# Patient Record
Sex: FEMALE | Race: BLACK OR AFRICAN AMERICAN | NOT HISPANIC OR LATINO | Employment: UNEMPLOYED | URBAN - METROPOLITAN AREA
[De-identification: names, ages, dates, MRNs, and addresses within clinical notes are randomized per-mention and may not be internally consistent; named-entity substitution may affect disease eponyms.]

---

## 2021-07-05 ENCOUNTER — HOSPITAL ENCOUNTER (EMERGENCY)
Dept: EMERGENCY MEDICINE | Facility: CLINIC | Age: 66
Discharge: HOME | End: 2021-07-05
Attending: EMERGENCY MEDICINE
Payer: COMMERCIAL

## 2021-07-05 DIAGNOSIS — M25.532 LEFT WRIST PAIN: ICD-10-CM

## 2021-07-05 LAB
BASOPHILS # BLD AUTO: 0.1 THOU/UL (ref 0–0.2)
BASOPHILS NFR BLD AUTO: 1 % (ref 0–2)
C REACTIVE PROTEIN LHE: 0.8 MG/DL (ref 0–0.8)
EOSINOPHIL # BLD AUTO: 0.1 THOU/UL (ref 0–0.4)
EOSINOPHIL NFR BLD AUTO: 1 % (ref 0–6)
ERYTHROCYTE [DISTWIDTH] IN BLOOD BY AUTOMATED COUNT: 13.7 % (ref 11–14.5)
HCT VFR BLD AUTO: 42.3 % (ref 35–47)
HGB BLD-MCNC: 13.3 G/DL (ref 12–16)
IMM GRANULOCYTES # BLD: 0 THOU/UL
IMM GRANULOCYTES NFR BLD: 0 %
LYMPHOCYTES # BLD AUTO: 2.7 THOU/UL (ref 0.8–4.4)
LYMPHOCYTES NFR BLD AUTO: 33 % (ref 20–40)
MCH RBC QN AUTO: 28.1 PG (ref 27–34)
MCHC RBC AUTO-ENTMCNC: 31.4 G/DL (ref 32–36)
MCV RBC AUTO: 89 FL (ref 80–100)
MONOCYTES # BLD AUTO: 0.6 THOU/UL (ref 0–0.9)
MONOCYTES NFR BLD AUTO: 7 % (ref 2–10)
NEUTROPHILS # BLD AUTO: 4.9 THOU/UL (ref 2–7.7)
NEUTROPHILS NFR BLD AUTO: 58 % (ref 50–70)
PLATELET # BLD AUTO: 206 THOU/UL (ref 140–440)
PMV BLD AUTO: 12 FL (ref 8.5–12.5)
RBC # BLD AUTO: 4.74 MILL/UL (ref 3.8–5.4)
WBC: 8.4 THOU/UL (ref 4–11)

## 2021-07-05 ASSESSMENT — MIFFLIN-ST. JEOR: SCORE: 1260.76

## 2021-07-30 VITALS — HEIGHT: 64 IN | BODY MASS INDEX: 27.31 KG/M2 | WEIGHT: 160 LBS

## 2021-07-30 NOTE — ED NOTES
ED Notes by Saeed Sanchez, RN at 7/5/2021 11:01 PM     Author: Saeed Sanchez RN Service: -- Author Type: Registered Nurse    Filed: 7/5/2021 11:02 PM Date of Service: 7/5/2021 11:01 PM Status: Signed    : Saeed Sanchez RN (Registered Nurse)       Ortho splint applied to the left wrist, good peripheral circulation post application

## 2021-07-30 NOTE — ED PROVIDER NOTES
"ED Provider Notes by Davonte Prescott DO at 7/5/2021  8:42 PM     Author: Davonte Prescott DO Service: Emergency Medicine Author Type: Physician    Filed: 7/6/2021  2:07 AM Date of Service: 7/5/2021  8:42 PM Status: Signed    : Davonte Prescott DO (Physician)       EMERGENCY DEPARTMENT NOTE     Name: Lucero Cervantes    Age/Sex: 66 y.o. female   MRN: [id number]   Evaluation Date & Time:  7/5/2021  8:08 PM    PCP:    Wilber Otoole MD   ED Provider: Davonte Prescott D.O.       CHIEF COMPLAINT      Chief Complaint   Patient presents with   ? Wrist Pain       DIAGNOSIS & DISPOSITION     1. Left wrist pain      DISPOSITION: Home    At the conclusion of the encounter I discussed the results of all of the tests and the disposition. The questions were answered. The patient or family acknowledged understanding and was agreeable with the care plan.    TOTAL CRITICAL CARE TIME (EXCLUDING PROCEDURES): Not applicable    PROCEDURES:   None    EMERGENCY DEPARTMENT COURSE/MEDICAL DECISION MAKING   8:32 PM I met with the patient to gather history and to perform my initial exam.  We discussed treatment options and the plan for care while in the Emergency Department. PPE worn includes surgical mask and cap.    66 y.o. female with relevant past history of diet-controlled diabetes, hypertension, hyperlipidemia GERD who presents to the emergency department for evaluation of left wrist pain that was noted after she had fallen asleep possibly been sleeping on it.  Patient denied any history of trauma.  Vital signs:/90 (Patient Position: Semi-ramirez)   Pulse 85   Temp 97.8  F (36.6  C) (Oral)   Resp 16   Ht 5' 4\" (1.626 m)   Wt 160 lb (72.6 kg)   SpO2 98%   BMI 27.46 kg/m    Pertinent physical exam findings:  Diagnostic studies:  Imaging: Left wrist x-ray: No fracture  Lab: WBC 8.4, CRP 0.8  Interventions: Hydrocodone, neurologic, wrist splint  Medical decision making: Pain is improved.  Because of pain not " definitively identified.  Has history of diet-controlled diabetes but exam does not suggest septic arthritis and laboratory evaluation reassuring.  No evidence of fracture or bony abnormality on x-ray.  Patient will be discharged and will use this splint for comfort until follow-up with primary care physician within 2 days.  Continue Tylenol and ibuprofen.  If progressive symptoms including worsening pain not improved with Tylenol ibuprofen or develop s swelling or redness of the wrist will return the emergency department.    ED INTERVENTIONS     Medications   HYDROcodone-acetaminophen 5-325 mg per tablet 1 tablet (1 tablet Oral Given 7/5/21 2116)   ketorolac injection 15 mg (TORADOL) (15 mg Intravenous Given 7/5/21 2116)       DISCHARGE MEDICATIONS        Medication List      ASK your doctor about these medications    famotidine 20 MG tablet  Commonly known as: PEPCID  Take 1 tablet (20 mg total) by mouth 2 (two) times a day.     ondansetron 4 MG tablet  Commonly known as: ZOFRAN  Take 1-2 tablets (4-8 mg total) by mouth every 8 (eight) hours as needed for nausea.     sucralfate 100 mg/mL suspension  Commonly known as: CARAFATE  Take 10 mL (1 g total) by mouth 4 (four) times a day.              INFORMATION SOURCE AND LIMITATIONS    History/Exam limitations: None  Patient information was obtained from: Patient   Use of : Yes, patient's son acted as .     HISTORY OF PRESENT ILLNESS   Lucero A Woldeyes female with a relevant past history of HTN, DM type II on insulin, who presents to this ED via walk in for evaluation of wrist pain.    Patient reports onset of 10/10 left wrist pain yesterday morning upon waking up after sleeping on her arm. She denies any trauma or falls. She endorses worse pain with palpation. Denies any other complaints.        REVIEW OF SYSTEMS:   Constitutional: Negative for  fever.   HENT: Negative for URI symptoms or sore throat.    Eyes: Negative for visual disturbance.    Cardiac: Negative for  chest pain,palpitations, near syncope or syncope  Respiratory: Negative for cough and shortness of breath.    Gastrointestinal: Negative for abdominal pain, nausea, vomiting, constipation, diarrhea, rectal bleeding or melena.  Genitourinary: Negative for dysuria, flank pain and hematuria.   Musculoskeletal: Negative for back pain. Positive for left wrist pain.  Skin: Negative for  rash  Neurological: Negative for dizziness, headache, syncope, speech difficulty, unilateral weakness or imbalance with walking.   Hematological: Negative for adenopathy. Does not bruise/bleed easily.   Psychiatric/Behavioral: Negative for confusion.       PATIENT HISTORY   Relevant past medical history reviewed with patient, unless otherwise stated in HPI, history not pertinent to this visit.  Patient Active Problem List   Diagnosis   ? Age-related nuclear cataract of both eyes   ? Controlled type 2 diabetes mellitus without complication, without long-term current use of insulin (H)   ? Essential hypertension   ? Lumbar radiculopathy   ? Positive FIT (fecal immunochemical test)   ? Pure hypercholesterolemia   ? Nonspecific reaction to tuberculin skin test without active tuberculosis   ? Sciatica   ? Swelling, mass, or lump in chest     No past surgical history on file.  No family history on file.  Social History     Socioeconomic History   ? Marital status: Single     Spouse name: Not on file   ? Number of children: Not on file   ? Years of education: Not on file   ? Highest education level: Not on file   Occupational History   ? Not on file   Social Needs   ? Financial resource strain: Not on file   ? Food insecurity     Worry: Not on file     Inability: Not on file   ? Transportation needs     Medical: Not on file     Non-medical: Not on file   Tobacco Use   ? Smoking status: Not on file   Substance and Sexual Activity   ? Alcohol use: Not on file   ? Drug use: Not on file   ? Sexual activity: Not on file  "  Lifestyle   ? Physical activity     Days per week: Not on file     Minutes per session: Not on file   ? Stress: Not on file   Relationships   ? Social connections     Talks on phone: Not on file     Gets together: Not on file     Attends Spiritism service: Not on file     Active member of club or organization: Not on file     Attends meetings of clubs or organizations: Not on file     Relationship status: Not on file   ? Intimate partner violence     Fear of current or ex partner: Not on file     Emotionally abused: Not on file     Physically abused: Not on file     Forced sexual activity: Not on file   Other Topics Concern   ? Not on file   Social History Narrative   ? Not on file     No Known Allergies      OUTPATIENT MEDICATIONS     No current facility-administered medications on file prior to encounter.      Current Outpatient Medications on File Prior to Encounter   Medication Sig   ? famotidine (PEPCID) 20 MG tablet Take 1 tablet (20 mg total) by mouth 2 (two) times a day.   ? ondansetron (ZOFRAN) 4 MG tablet Take 1-2 tablets (4-8 mg total) by mouth every 8 (eight) hours as needed for nausea.   ? sucralfate (CARAFATE) 100 mg/mL suspension Take 10 mL (1 g total) by mouth 4 (four) times a day.         PHYSICAL EXAM     Vitals:    07/05/21 2003 07/05/21 2329   BP: (!) 154/99 145/90   Patient Position: Sitting Semi-ramirez   Pulse: 90 85   Resp: 20 16   Temp: 97.8  F (36.6  C)    TempSrc: Oral    SpO2: 98% 98%   Weight: 160 lb (72.6 kg)    Height: 5' 4\" (1.626 m)        Physical Exam   Constitutional: Oriented to person, place, and time. Appears well-developed and well-nourished.   HEENT:    Head: Atraumatic.    Nose: Nose normal.    Mouth/Throat: Oropharynx is clear and moist.    Eyes: EOM are normal. Pupils are equal, round, and reactive to light.   Neck: Normal range of motion. Neck supple.   Cardiovascular: Normal rate, regular rhythm and normal heart sounds.    Pulmonary/Chest: Normal effort  and breath " sounds normal.   Abdominal: Soft. Bowel sounds are normal.   Musculoskeletal: Normal range of motion. Tenderness of left wrist. Elbow nontender. Shoulder nontender.  Neurological: Alert and oriented to person, place, and time. Normal strength.No sensory deficit. No cranial nerve deficit . Coordination serial fingers, finger-to-nose normal and gait normal.   Skin: Skin is warm and dry.   Psychiatric: Normal mood and affect. Behavior is normal. Thought content normal.       DIAGNOSTICS    LABORATORY FINDINGS (REVIEWED AND INTERPRETED):  Labs Reviewed   HM1 (CBC WITH DIFF) - Abnormal; Notable for the following components:       Result Value    MCHC 31.4 (*)     All other components within normal limits   C-REACTIVE PROTEIN - Normal   1(CBC WITH DIFFERENTIAL)    Narrative:     The following orders were created for panel order HM1(CBC and Differential).  Procedure                               Abnormality         Status                     ---------                               -----------         ------                     HM1 (CBC with Diff)[[id number]]          Abnormal            Final result                 Please view results for these tests on the individual orders.         IMAGING (REVIEWED AND INTERPRETED):  Xr Wrist Left 2 Vws    Result Date: 7/5/2021  EXAM: XR WRIST LEFT 2 VWS LOCATION: [facility] DATE/TIME: 7/5/2021 9:04 PM INDICATION: non traumatic swollen painful wrist COMPARISON: None.     Normal joint spaces and alignment. No fracture.            I, Chip Coleman, am serving as a scribe to document services personally performed by Davonte Prescott D.O., based on my observation and the providers statements to me.    I, Davonte Prescott D.O., attest that Chip Coleman is acting in a scribe capacity, has observed my performance of the services and has documented them in accordance with my direction.    Davonte Prescott D.O.  EMERGENCY MEDICINE   07/06/21  [facility]  [facility] EMERGENCY ROOM  [address] [address]  Dept: [phone]  Loc: [phone]     Davonte Prescott,   07/06/21 0207

## 2021-10-31 ENCOUNTER — HOSPITAL ENCOUNTER (EMERGENCY)
Facility: CLINIC | Age: 66
Discharge: HOME | End: 2021-10-31
Attending: EMERGENCY MEDICINE | Admitting: EMERGENCY MEDICINE
Payer: COMMERCIAL

## 2021-10-31 VITALS
SYSTOLIC BLOOD PRESSURE: 146 MMHG | WEIGHT: 165 LBS | DIASTOLIC BLOOD PRESSURE: 93 MMHG | TEMPERATURE: 97.8 F | OXYGEN SATURATION: 99 % | RESPIRATION RATE: 18 BRPM | HEART RATE: 82 BPM | BODY MASS INDEX: 28.32 KG/M2

## 2021-10-31 DIAGNOSIS — R22.1 NECK NODULE: ICD-10-CM

## 2021-10-31 DIAGNOSIS — H60.391 INFECTIVE OTITIS EXTERNA, RIGHT: ICD-10-CM

## 2021-10-31 PROCEDURE — 250N000013 HC RX MED GY IP 250 OP 250 PS 637: Performed by: EMERGENCY MEDICINE

## 2021-10-31 PROCEDURE — 99283 EMERGENCY DEPT VISIT LOW MDM: CPT

## 2021-10-31 RX ORDER — CIPROFLOXACIN AND DEXAMETHASONE 3; 1 MG/ML; MG/ML
4 SUSPENSION/ DROPS AURICULAR (OTIC) 2 TIMES DAILY
Qty: 2.8 ML | Refills: 0 | Status: SHIPPED | OUTPATIENT
Start: 2021-10-31 | End: 2021-11-07

## 2021-10-31 RX ORDER — CIPROFLOXACIN AND DEXAMETHASONE 3; 1 MG/ML; MG/ML
4 SUSPENSION/ DROPS AURICULAR (OTIC) ONCE
Status: COMPLETED | OUTPATIENT
Start: 2021-10-31 | End: 2021-10-31

## 2021-10-31 RX ADMIN — CIPROFLOXACIN AND DEXAMETHASONE 4 DROP: 3; 1 SUSPENSION/ DROPS AURICULAR (OTIC) at 20:43

## 2021-10-31 ASSESSMENT — ENCOUNTER SYMPTOMS
LIGHT-HEADEDNESS: 0
VOMITING: 0
CONFUSION: 0
FEVER: 0
DIAPHORESIS: 0
FATIGUE: 0
NAUSEA: 0
COLOR CHANGE: 1
HEADACHES: 0
DIZZINESS: 0
CHILLS: 0
NUMBNESS: 0

## 2021-10-31 NOTE — ED TRIAGE NOTES
Patient has R ear external infection for the past 3 days, unsure if it is also internal, she had a surgery on her neck and is wondering if there is connection.  Siri Mendoza RN.......10/31/2021 6:58 PM

## 2021-11-01 NOTE — ED PROVIDER NOTES
EMERGENCY DEPARTMENT ENCOUNTER      NAME: Judith Cervantes  AGE: 66 year old female  YOB: 1955  MRN: [id number]  EVALUATION DATE & TIME: 10/31/2021  8:06 PM    PCP: Wilber Otoole    ED PROVIDER: George Osborne M.D.      Chief Complaint   Patient presents with     Infection         IMPRESSION  1. Infective otitis externa, right    2. Neck nodule        PLAN  - ciprodex 4 drops right ear q12h x7 days  - NSAIDs for pain  - close PCP follow up  - discharge to home    ED COURSE & MEDICAL DECISION MAKING    ED Course as of Oct 31 2028   Sun Oct 31, 2021   2022 66yoF with mild otitis externa; TM intact with no suggestion of AOM on exam. No concern for mastoiditis, meningitis, sepsis, chondritis, emergent life-threatening etiology. Also asked about nontender anterior neck nodule; states she had surgery here 16 years ago---doubt contributory or related to OE here now---advised PCP clinic follow up about this and patient & son voiced understanding and agreement. Given first dose of CiproDex here now and will continue at home. Return precautions and need for PCP follow up discussed and understood. No further questions at the time of discharge.          8:13 PM I met with the patient for the initial interview and physical examination. Discussed plan for treatment and workup in the ED. We discussed the plan for discharge and the patient is agreeable. Reviewed supportive cares, symptomatic treatment, outpatient follow up, and reasons to return to the Emergency Department. Patient to be discharged by ED RN.        I wore the following PPE during this patient encounter:  N95 mask, face shield w/ eye protection, gloves.    MEDICATIONS GIVEN IN THE EMERGENCY DEPARTMENT  Medications   ciprofloxacin-dexamethasone (CIPRODEX) 0.3-0.1 % otic suspension 4 drop (has no administration in time range)         NEW PRESCRIPTIONS STARTED AT TODAY'S ER VISIT  Current Discharge Medication List      START taking these medications     Details   ciprofloxacin-dexamethasone (CIPRODEX) 0.3-0.1 % otic suspension Place 4 drops into the right ear 2 times daily for 7 days  Qty: 2.8 mL, Refills: 0         CONTINUE these medications which have NOT CHANGED    Details   famotidine (PEPCID) 20 MG tablet [FAMOTIDINE (PEPCID) 20 MG TABLET] Take 1 tablet (20 mg total) by mouth 2 (two) times a day.  Qty: 30 tablet, Refills: 0    Associated Diagnoses: Acute gastritis without hemorrhage, unspecified gastritis type      ondansetron (ZOFRAN) 4 MG tablet [ONDANSETRON (ZOFRAN) 4 MG TABLET] Take 1-2 tablets (4-8 mg total) by mouth every 8 (eight) hours as needed for nausea.  Qty: 12 tablet, Refills: 0    Associated Diagnoses: Acute gastritis without hemorrhage, unspecified gastritis type      sucralfate (CARAFATE) 100 mg/mL suspension [SUCRALFATE (CARAFATE) 100 MG/ML SUSPENSION] Take 10 mL (1 g total) by mouth 4 (four) times a day.  Qty: 414 mL, Refills: 0    Associated Diagnoses: Acute gastritis without hemorrhage, unspecified gastritis type                 =================================================================      HPI  Patient information was obtained from: Patient    Use of : Lithuanian via family member at bedside      Judith Cervantes is a 66 year old female with a pertinent history of DM2, HTN, who presents to this ED by private car with family member for evaluation of ear pain, swelling and redness.     Patient reports a sudden onset of right external ear pain, swelling and redness that began 3 days ago. She is concerned it is infected, and the infection might have traveled inside of her ear. Patient notes she had neck surgery to remove a mass 16 years ago and notes a similar lump has returned, and wonder if it is connected to her ear symptoms. Denies drainage from the ear, or any additional symptoms at this time.       REVIEW OF SYSTEMS   Review of Systems   Constitutional: Negative for chills, diaphoresis, fatigue and fever.   HENT:  Positive for ear pain. Negative for ear discharge, hearing loss and tinnitus.         Positive for right ear swelling.   Gastrointestinal: Negative for nausea and vomiting.   Skin: Positive for color change (redness, right ear).   Neurological: Negative for dizziness, light-headedness, numbness and headaches.   Psychiatric/Behavioral: Negative for confusion.   All other systems reviewed and are negative.            --------------- MEDICAL HISTORY ---------------  PAST MEDICAL HISTORY:  History reviewed. No pertinent past medical history.  Patient Active Problem List   Diagnosis     Age-related nuclear cataract of both eyes     Controlled type 2 diabetes mellitus without complication, without long-term current use of insulin (H)     Essential hypertension     Lumbar radiculopathy     Positive FIT (fecal immunochemical test)     Pure hypercholesterolemia     Nonspecific reaction to tuberculin skin test without active tuberculosis     Sciatica     Swelling, mass, or lump in chest       PAST SURGICAL HISTORY:  History reviewed. No pertinent surgical history.    CURRENT MEDICATIONS:      Current Facility-Administered Medications:      ciprofloxacin-dexamethasone (CIPRODEX) 0.3-0.1 % otic suspension 4 drop, 4 drop, Right Ear, Once, George Osborne MD    Current Outpatient Medications:      ciprofloxacin-dexamethasone (CIPRODEX) 0.3-0.1 % otic suspension, Place 4 drops into the right ear 2 times daily for 7 days, Disp: 2.8 mL, Rfl: 0     famotidine (PEPCID) 20 MG tablet, [FAMOTIDINE (PEPCID) 20 MG TABLET] Take 1 tablet (20 mg total) by mouth 2 (two) times a day., Disp: 30 tablet, Rfl: 0     ondansetron (ZOFRAN) 4 MG tablet, [ONDANSETRON (ZOFRAN) 4 MG TABLET] Take 1-2 tablets (4-8 mg total) by mouth every 8 (eight) hours as needed for nausea., Disp: 12 tablet, Rfl: 0     sucralfate (CARAFATE) 100 mg/mL suspension, [SUCRALFATE (CARAFATE) 100 MG/ML SUSPENSION] Take 10 mL (1 g total) by mouth 4 (four) times a day., Disp:  414 mL, Rfl: 0    ALLERGIES:  No Known Allergies    FAMILY HISTORY:  History reviewed. No pertinent family history.      SOCIAL HISTORY:   Social History     Socioeconomic History     Marital status: Single     Spouse name: None     Number of children: None     Years of education: None     Highest education level: None   Occupational History     None   Tobacco Use     Smoking status: None   Substance and Sexual Activity     Alcohol use: None     Drug use: None     Sexual activity: None   Other Topics Concern     None   Social History Narrative     None     Social Determinants of Health     Financial Resource Strain:      Difficulty of Paying Living Expenses:    Food Insecurity:      Worried About Running Out of Food in the Last Year:      Ran Out of Food in the Last Year:    Transportation Needs:      Lack of Transportation (Medical):      Lack of Transportation (Non-Medical):    Physical Activity:      Days of Exercise per Week:      Minutes of Exercise per Session:    Stress:      Feeling of Stress :    Social Connections:      Frequency of Communication with Friends and Family:      Frequency of Social Gatherings with Friends and Family:      Attends Restorationism Services:      Active Member of Clubs or Organizations:      Attends Club or Organization Meetings:      Marital Status:    Intimate Partner Violence:      Fear of Current or Ex-Partner:      Emotionally Abused:      Physically Abused:      Sexually Abused:          --------------- PHYSICAL EXAM ---------------  Nursing notes and vitals reviewed by me.  VITALS:  Vitals:    10/31/21 1858   BP: (!) 146/93   Pulse: 82   Resp: 18   Temp: 97.8  F (36.6  C)   TempSrc: Oral   SpO2: 99%   Weight: 74.8 kg (165 lb)       PHYSICAL EXAM:    General:  alert, interactive, no distress  Eyes:  conjunctivae clear, conjugate gaze   HENT:  atraumatic, nose with no rhinorrhea, oropharynx clear. Right ear with moderate swelling and redness of the canal with no purulence. Right  TM is intact with no erythema or bulging. No mastoid tenderness. No tenderness or abnormality to the pinna.  Neck:  no meningismus. Nontender 1 cm nodule to the anterior neck just right of the midline, near thyroid cartilage.  Cardiovascular:  HR 70s during exam, regular rhythm, no murmurs, brisk cap refill  Chest:  no chest wall tenderness  Pulmonary:  no stridor, normal phonation, normal work of breathing, clear lungs bilaterally  Abdomen:  soft, nondistended, nontender  :  no CVA tenderness  Back:  no midline spinal tenderness  Musculoskeletal:  no pretibial edema, no calf tenderness. Gross ROM intact to joints of extremities with no obvious deformities.  Skin:  warm, dry, no rash  Neuro:  awake, alert, answers questions appropriately, follows commands, moves all limbs  Psych:  calm, normal affect          I, Viki Cosme, am serving as a scribe to document services personally performed by Dr. George Osborne based on my observation and the provider's statements to me. I, George Osborne MD attest that Viki Cosme is acting in a scribe capacity, has observed my performance of the services and has documented them in accordance with my direction.      George Osborne MD  10/31/21  Emergency Medicine  [facility] EMERGENCY ROOM  [address] [id number] [phone]  Dept: [phone]     George Osborne MD  10/31/21 9645

## 2021-11-01 NOTE — DISCHARGE INSTRUCTIONS
Take the antibiotic (CiproDex) as prescribed for your ear infection.    Follow up with your Primary Care provider in 2 days for a recheck. Ask them about your neck nodule.    Return to the Emergency Department for any high fevers, persistent vomiting, or any other concerns.

## 2024-02-28 ENCOUNTER — HOSPITAL ENCOUNTER (EMERGENCY)
Facility: CLINIC | Age: 69
Discharge: HOME | End: 2024-02-29
Attending: EMERGENCY MEDICINE | Admitting: EMERGENCY MEDICINE
Payer: MEDICARE

## 2024-02-28 DIAGNOSIS — R94.31 ABNORMAL ELECTROCARDIOGRAM: ICD-10-CM

## 2024-02-28 DIAGNOSIS — J02.0 STREP THROAT: ICD-10-CM

## 2024-02-28 DIAGNOSIS — E87.6 HYPOKALEMIA: ICD-10-CM

## 2024-02-28 LAB
ALBUMIN UR-MCNC: NEGATIVE MG/DL
APPEARANCE UR: CLEAR
BASOPHILS # BLD AUTO: 0 10E3/UL (ref 0–0.2)
BASOPHILS NFR BLD AUTO: 0 %
BILIRUB UR QL STRIP: NEGATIVE
COLOR UR AUTO: ABNORMAL
EOSINOPHIL # BLD AUTO: 0 10E3/UL (ref 0–0.7)
EOSINOPHIL NFR BLD AUTO: 0 %
ERYTHROCYTE [DISTWIDTH] IN BLOOD BY AUTOMATED COUNT: 13.7 % (ref 10–15)
GLUCOSE UR STRIP-MCNC: NEGATIVE MG/DL
HCT VFR BLD AUTO: 39.2 % (ref 35–47)
HGB BLD-MCNC: 13 G/DL (ref 11.7–15.7)
HGB UR QL STRIP: NEGATIVE
IMM GRANULOCYTES # BLD: 0 10E3/UL
IMM GRANULOCYTES NFR BLD: 0 %
KETONES UR STRIP-MCNC: 20 MG/DL
LACTATE SERPL-SCNC: 0.9 MMOL/L (ref 0.7–2)
LEUKOCYTE ESTERASE UR QL STRIP: NEGATIVE
LYMPHOCYTES # BLD AUTO: 1.8 10E3/UL (ref 0–5.3)
LYMPHOCYTES NFR BLD AUTO: 17 %
MCH RBC QN AUTO: 28.7 PG (ref 26.5–33)
MCHC RBC AUTO-ENTMCNC: 33.2 G/DL (ref 31.5–36.5)
MCV RBC AUTO: 87 FL (ref 78–100)
MONOCYTES # BLD AUTO: 0.7 10E3/UL (ref 0–1.3)
MONOCYTES NFR BLD AUTO: 7 %
NEUTROPHILS # BLD AUTO: 7.9 10E3/UL (ref 1.6–8.3)
NEUTROPHILS NFR BLD AUTO: 75 %
NITRATE UR QL: NEGATIVE
NRBC # BLD AUTO: 0 10E3/UL
NRBC BLD AUTO-RTO: 0 /100
PH UR STRIP: 6.5 [PH] (ref 5–7)
PLATELET # BLD AUTO: 156 10E3/UL (ref 150–450)
RBC # BLD AUTO: 4.53 10E6/UL (ref 3.8–5.2)
RBC URINE: 0 /HPF
SP GR UR STRIP: 1.01 (ref 1–1.03)
SQUAMOUS EPITHELIAL: 1 /HPF
UROBILINOGEN UR STRIP-MCNC: <2 MG/DL
WBC # BLD AUTO: 10.6 10E3/UL (ref 4–11)
WBC URINE: 2 /HPF

## 2024-02-28 PROCEDURE — 83605 ASSAY OF LACTIC ACID: CPT | Performed by: EMERGENCY MEDICINE

## 2024-02-28 PROCEDURE — 81001 URINALYSIS AUTO W/SCOPE: CPT | Performed by: EMERGENCY MEDICINE

## 2024-02-28 PROCEDURE — 83735 ASSAY OF MAGNESIUM: CPT | Performed by: EMERGENCY MEDICINE

## 2024-02-28 PROCEDURE — 99285 EMERGENCY DEPT VISIT HI MDM: CPT | Mod: 25

## 2024-02-28 PROCEDURE — 80053 COMPREHEN METABOLIC PANEL: CPT | Performed by: EMERGENCY MEDICINE

## 2024-02-28 PROCEDURE — 87637 SARSCOV2&INF A&B&RSV AMP PRB: CPT | Performed by: EMERGENCY MEDICINE

## 2024-02-28 PROCEDURE — 84484 ASSAY OF TROPONIN QUANT: CPT | Performed by: EMERGENCY MEDICINE

## 2024-02-28 PROCEDURE — 85652 RBC SED RATE AUTOMATED: CPT | Performed by: EMERGENCY MEDICINE

## 2024-02-28 PROCEDURE — 86140 C-REACTIVE PROTEIN: CPT | Performed by: EMERGENCY MEDICINE

## 2024-02-28 PROCEDURE — 85025 COMPLETE CBC W/AUTO DIFF WBC: CPT | Performed by: EMERGENCY MEDICINE

## 2024-02-28 PROCEDURE — 51798 US URINE CAPACITY MEASURE: CPT

## 2024-02-28 PROCEDURE — 36415 COLL VENOUS BLD VENIPUNCTURE: CPT | Performed by: EMERGENCY MEDICINE

## 2024-02-28 ASSESSMENT — ACTIVITIES OF DAILY LIVING (ADL): ADLS_ACUITY_SCORE: 33

## 2024-02-28 ASSESSMENT — COLUMBIA-SUICIDE SEVERITY RATING SCALE - C-SSRS
2. HAVE YOU ACTUALLY HAD ANY THOUGHTS OF KILLING YOURSELF IN THE PAST MONTH?: NO
1. IN THE PAST MONTH, HAVE YOU WISHED YOU WERE DEAD OR WISHED YOU COULD GO TO SLEEP AND NOT WAKE UP?: NO
6. HAVE YOU EVER DONE ANYTHING, STARTED TO DO ANYTHING, OR PREPARED TO DO ANYTHING TO END YOUR LIFE?: NO

## 2024-02-29 ENCOUNTER — APPOINTMENT (OUTPATIENT)
Dept: RADIOLOGY | Facility: CLINIC | Age: 69
End: 2024-02-29
Attending: EMERGENCY MEDICINE
Payer: MEDICARE

## 2024-02-29 VITALS
RESPIRATION RATE: 24 BRPM | DIASTOLIC BLOOD PRESSURE: 65 MMHG | HEART RATE: 75 BPM | TEMPERATURE: 102 F | SYSTOLIC BLOOD PRESSURE: 123 MMHG | OXYGEN SATURATION: 96 %

## 2024-02-29 LAB
ALBUMIN SERPL BCG-MCNC: 4.2 G/DL (ref 3.5–5.2)
ALP SERPL-CCNC: 76 U/L (ref 40–150)
ALT SERPL W P-5'-P-CCNC: 15 U/L (ref 0–50)
ANION GAP SERPL CALCULATED.3IONS-SCNC: 11 MMOL/L (ref 7–15)
AST SERPL W P-5'-P-CCNC: 31 U/L (ref 0–45)
ATRIAL RATE - MUSE: 80 BPM
BILIRUB SERPL-MCNC: 0.4 MG/DL
BUN SERPL-MCNC: 5.5 MG/DL (ref 8–23)
CALCIUM SERPL-MCNC: 9.2 MG/DL (ref 8.8–10.2)
CHLORIDE SERPL-SCNC: 102 MMOL/L (ref 98–107)
CREAT SERPL-MCNC: 0.32 MG/DL (ref 0.51–0.95)
CRP SERPL-MCNC: 59.6 MG/L
DEPRECATED HCO3 PLAS-SCNC: 26 MMOL/L (ref 22–29)
DIASTOLIC BLOOD PRESSURE - MUSE: 72 MMHG
EGFRCR SERPLBLD CKD-EPI 2021: >90 ML/MIN/1.73M2
ERYTHROCYTE [SEDIMENTATION RATE] IN BLOOD BY WESTERGREN METHOD: 35 MM/HR (ref 0–30)
FLUAV RNA SPEC QL NAA+PROBE: NEGATIVE
FLUBV RNA RESP QL NAA+PROBE: NEGATIVE
GLUCOSE SERPL-MCNC: 157 MG/DL (ref 70–99)
GROUP A STREP BY PCR: DETECTED
INTERPRETATION ECG - MUSE: NORMAL
MAGNESIUM SERPL-MCNC: 1.8 MG/DL (ref 1.7–2.3)
P AXIS - MUSE: 72 DEGREES
POTASSIUM SERPL-SCNC: 3.2 MMOL/L (ref 3.4–5.3)
PR INTERVAL - MUSE: 174 MS
PROT SERPL-MCNC: 7.6 G/DL (ref 6.4–8.3)
QRS DURATION - MUSE: 82 MS
QT - MUSE: 338 MS
QTC - MUSE: 389 MS
R AXIS - MUSE: 83 DEGREES
RSV RNA SPEC NAA+PROBE: NEGATIVE
SARS-COV-2 RNA RESP QL NAA+PROBE: NEGATIVE
SODIUM SERPL-SCNC: 139 MMOL/L (ref 135–145)
SYSTOLIC BLOOD PRESSURE - MUSE: 155 MMHG
T AXIS - MUSE: -32 DEGREES
TROPONIN T SERPL HS-MCNC: 7 NG/L
TROPONIN T SERPL HS-MCNC: 7 NG/L
VENTRICULAR RATE- MUSE: 80 BPM

## 2024-02-29 PROCEDURE — 36415 COLL VENOUS BLD VENIPUNCTURE: CPT | Performed by: EMERGENCY MEDICINE

## 2024-02-29 PROCEDURE — 96361 HYDRATE IV INFUSION ADD-ON: CPT

## 2024-02-29 PROCEDURE — 93005 ELECTROCARDIOGRAM TRACING: CPT | Performed by: EMERGENCY MEDICINE

## 2024-02-29 PROCEDURE — 87651 STREP A DNA AMP PROBE: CPT | Performed by: EMERGENCY MEDICINE

## 2024-02-29 PROCEDURE — 250N000011 HC RX IP 250 OP 636: Performed by: EMERGENCY MEDICINE

## 2024-02-29 PROCEDURE — 71046 X-RAY EXAM CHEST 2 VIEWS: CPT

## 2024-02-29 PROCEDURE — 84484 ASSAY OF TROPONIN QUANT: CPT | Performed by: EMERGENCY MEDICINE

## 2024-02-29 PROCEDURE — 96374 THER/PROPH/DIAG INJ IV PUSH: CPT

## 2024-02-29 PROCEDURE — 250N000013 HC RX MED GY IP 250 OP 250 PS 637: Performed by: EMERGENCY MEDICINE

## 2024-02-29 PROCEDURE — 258N000003 HC RX IP 258 OP 636: Performed by: EMERGENCY MEDICINE

## 2024-02-29 RX ORDER — ACETAMINOPHEN 325 MG/1
650 TABLET ORAL ONCE
Status: COMPLETED | OUTPATIENT
Start: 2024-02-29 | End: 2024-02-29

## 2024-02-29 RX ORDER — PENICILLIN V POTASSIUM 500 MG/1
500 TABLET, FILM COATED ORAL 2 TIMES DAILY
Qty: 20 TABLET | Refills: 0 | Status: SHIPPED | OUTPATIENT
Start: 2024-02-29 | End: 2024-03-10

## 2024-02-29 RX ORDER — POTASSIUM CHLORIDE 1500 MG/1
20 TABLET, EXTENDED RELEASE ORAL ONCE
Status: COMPLETED | OUTPATIENT
Start: 2024-02-29 | End: 2024-02-29

## 2024-02-29 RX ORDER — IOPAMIDOL 755 MG/ML
90 INJECTION, SOLUTION INTRAVASCULAR ONCE
Status: DISCONTINUED | OUTPATIENT
Start: 2024-02-29 | End: 2024-02-29 | Stop reason: HOSPADM

## 2024-02-29 RX ORDER — PENICILLIN V POTASSIUM 250 MG/1
500 TABLET, FILM COATED ORAL ONCE
Status: COMPLETED | OUTPATIENT
Start: 2024-02-29 | End: 2024-02-29

## 2024-02-29 RX ADMIN — SODIUM CHLORIDE 1000 ML: 9 INJECTION, SOLUTION INTRAVENOUS at 00:52

## 2024-02-29 RX ADMIN — FAMOTIDINE 20 MG: 10 INJECTION, SOLUTION INTRAVENOUS at 00:52

## 2024-02-29 RX ADMIN — ACETAMINOPHEN 650 MG: 325 TABLET ORAL at 01:23

## 2024-02-29 RX ADMIN — POTASSIUM CHLORIDE 20 MEQ: 1500 TABLET, EXTENDED RELEASE ORAL at 00:51

## 2024-02-29 RX ADMIN — PENICILLIN V POTASSIUM 500 MG: 250 TABLET, FILM COATED ORAL at 02:11

## 2024-02-29 ASSESSMENT — ACTIVITIES OF DAILY LIVING (ADL)
ADLS_ACUITY_SCORE: 35

## 2024-02-29 ASSESSMENT — ENCOUNTER SYMPTOMS
SORE THROAT: 1
BACK PAIN: 1
WEAKNESS: 1
ABDOMINAL PAIN: 0
NUMBNESS: 0
RHINORRHEA: 0
COUGH: 1
FEVER: 1
FATIGUE: 1

## 2024-02-29 NOTE — ED PROVIDER NOTES
EMERGENCY DEPARTMENT ENCOUNTER      NAME: Judith Cervantes  AGE: 68 year old female  YOB: 1955  MRN: [id number]  EVALUATION DATE & TIME: 2024 10:33 PM    PCP: Wilber Otoole    ED PROVIDER: Eric Dong MD      Chief Complaint   Patient presents with    Fever    Bladder Incontinence         FINAL IMPRESSION:  1. Strep throat    2. Hypokalemia    3. Abnormal electrocardiogram          ED COURSE & MEDICAL DECISION MAKIN:07 AM Met with the patient and performed my initial exam.  2:35 AM Rechecked and updated patient on lab and imaging results.  Pertinent Labs & Imaging studies reviewed. (See chart for details)  68 year old female presents to the Emergency Department for evaluation of fever, myalgias, throat pain with swallowing.  Right low back pain started today.  Reportedly was incontinent of urine.  Did leave urine sample here and has not been incontinent of urine here in the ER.  Normal strength to lower extremities.  Normal reflexes lower extremities.  No back surgery.  Not a IV drug user    No murmurs.    Abdomen soft nontender    Patient is asking about IV with vitamins    Denies chest pain or shortness of breath but does report generalized weakness    Differential includes abdominal infection such as cholecystitis or appendicitis or cystitis or pyelonephritis    With cough will obtain chest x-ray to evaluate for pneumonia    Will give IV fluids and Pepcid potassium.  Will obtain EKG, strep test, CRP, sed rate, magnesium, metabolic panel, CBC, COVID test, influenza, lactic acid and UA    UA not suggestive of cystitis    ED Course as of 24 0242   Thu 2024   0038 Magnesium: 1.8   0039 Presents with generalized weakness, sore throat, cough, reportedly has fever, all report he had urinary incontinence but yet on exam she has normal leg strength.  She was able to leave urine sample here   0104 Nursing states she was able to leave a urine sample just prior to x-ray so I doubt  urinary incontinence however with reported fevers and back pain elevated CRP difficult history will obtain MR imaging to evaluate for discitis   0105 Patient points to the right lower back as a source of her pain   0135 Potassium(!): 3.2  Potassium ordered   0135 Tested positive for strep which likely explains the fever, sore throat, headache, myalgias   0136 No weakness on exam.  Symmetric reflexes.  She reports urinary incontinence but she is not demonstrating incontinence here.  She has no postvoid residual.  Highly doubt discitis or cauda equina   0136 CRP Inflammation(!): 59.60   0136 Sed Rate(!): 35  Secondary to strep throat   0136 SARS CoV2 PCR: Negative   0136 Influenza A: Negative   0141 Strep Group A PCR(!): Detected  This explains majority of patient's symptoms   0142 No pneumonia present on my independent review of chest x-ray   0142 Troponin T, High Sensitivity: 7  Will obtain delta troponin based on EKG that shows new ischemic changes   0142 Will have patient follow-up with primary care doctor for further cardiac evaluation based on EKG   0143 History and examination not suggestive of ACS   0147 Developed fever here.  Given Tylenol   0147 BP(!): 183/82  Given Tylenol   0147 WBC: 10.6   0158 Given oral penicillin here   0235 Patient tells me that her primary care doctor is aware of the abnormal EKG and actually ordered a stress test already.  I discussed the importance of following up for that stress test based on the EKG being abnormal   0240 Discussed penicillin twice a day for 10 days for strep throat   0240 Per review of December 14, 2023 her primary care doctor recommended a stress echocardiogram.  I discussed with patient and patient's family importance of follow-up on this test     I doubt she is incontinent of urine since she has been not incontinent of urine here.  No abdominal tenderness on exam.  Doubt appendicitis or cholecystitis.  Normal white blood cell count.  Elevated sed rate and CRP  likely from strep throat.  She has no muffled voice or stridor or drooling.  Tolerating oral intake.  Developed a fever here.  Given oral Tylenol.  Chest x-ray negative.  EKG is abnormal.  Initial troponin normal but greater than 6 therefore delta troponin ordered    Medical Decision Making  Obtained supplemental history:Supplemental history obtained?: Documented in chart  Reviewed external records: External records reviewed?: Documented in chart  Care impacted by chronic illness:Diabetes, Hypertension, and Other: Hypothyroidism  Care significantly affected by social determinants of health:Access to Affordable Health Care  Did you consider but not order tests?: Work up considered but not performed and documented in chart, if applicable  Did you interpret images independently?: Independent interpretation of ECG and images noted in documentation, when applicable.  Consultation discussion with other provider:Did you involve another provider (consultant, , pharmacy, etc.)?: No  Discharge. I prescribed additional prescription strength medication(s) as charted. See documentation for any additional details.    At the conclusion of the encounter I discussed the results of all of the tests and the disposition. The questions were answered. The patient or family acknowledged understanding and was agreeable with the care plan.         MEDICATIONS GIVEN IN THE EMERGENCY:  Medications   iopamidol (ISOVUE-370) solution 90 mL (0 mLs Intravenous Hold 2/29/24 0145)   sodium chloride 0.9% BOLUS 1,000 mL (1,000 mLs Intravenous $New Bag 2/29/24 0052)   potassium chloride evangelist ER (KLOR-CON M20) CR tablet 20 mEq (20 mEq Oral $Given 2/29/24 0051)   famotidine (PEPCID) injection 20 mg (20 mg Intravenous $Given 2/29/24 0052)   acetaminophen (TYLENOL) tablet 650 mg (650 mg Oral $Given 2/29/24 0123)   penicillin V (VEETID) tablet 500 mg (500 mg Oral $Given 2/29/24 0211)       NEW PRESCRIPTIONS STARTED AT TODAY'S ER VISIT  New Prescriptions  "   PENICILLIN V (VEETID) 500 MG TABLET    Take 1 tablet (500 mg) by mouth 2 times daily for 10 days          =================================================================    HPI    Patient information was obtained from: Patient and patient's family    Use of : N/A        Judith Cervantes is a 68 year old female with a pertinent history of controlled T2DM, HTN, GERD, and hypothyroidism, who presents to this ED via walk-in for evaluation of fever and bladder incontinence.    Per patient's family, patient feels fatigue, sore throat (since Monday), generalized weakness, lower back pain, unsteady on feet when ambulating, and bladder incontinence that started this morning. Patient arrived to ER with wheelchair, but was able to walk to the car. Patient has a history of back pain with a pinched disc and has received injections to treat it \"a long time ago\", but no prior back surgeries. Patient reports feeling okay yesterday. Patient has a history of recurrent UTIs and patient's family notes she has bladder incontinence with previous UTIs. Patient is not on DM medications and reports diet-controlled diabetes. Patient endorses fevers, cough, and sick contacts (other family members), but no runny nose, numbness to lower extremities, and abdominal pain. She doesn't take any daily medications. No other reported complaints or concerns at this time.      REVIEW OF SYSTEMS   Review of Systems   Constitutional:  Positive for fatigue and fever.   HENT:  Positive for sore throat. Negative for rhinorrhea.    Respiratory:  Positive for cough.    Gastrointestinal:  Negative for abdominal pain.   Genitourinary:         Positive for bladder incontinence   Musculoskeletal:  Positive for back pain (lower).   Neurological:  Positive for weakness (generalized). Negative for numbness (to lower extremities).       PAST MEDICAL HISTORY:  No past medical history on file.    PAST SURGICAL HISTORY:  No past surgical history on " file.        CURRENT MEDICATIONS:    penicillin V (VEETID) 500 MG tablet  famotidine (PEPCID) 20 MG tablet  ondansetron (ZOFRAN) 4 MG tablet  sucralfate (CARAFATE) 100 mg/mL suspension        ALLERGIES:  No Known Allergies    FAMILY HISTORY:  No family history on file.    SOCIAL HISTORY:   Social History     Socioeconomic History    Marital status: Single       VITALS:  BP (!) 183/82   Pulse 83   Temp (!) 102  F (38.9  C) (Oral)   Resp 24   SpO2 94%     PHYSICAL EXAM    Vitals: BP (!) 183/82   Pulse 83   Temp (!) 102  F (38.9  C) (Oral)   Resp 24   SpO2 94%   General: Appears in no acute distress, awake, alert, interactive.  Eyes: Conjunctivae non-injected. Sclera anicteric.  HENT: Atraumatic. No tonsillar exudate. No stridor.  Neck: Supple.  Respiratory/Chest: Respiration unlabored.  Abdomen: non distended. Abdomen soft. Nontender.  No guarding or rigidity.  No CVA tenderness.  Musculoskeletal: Normal extremities. No edema or erythema.  No midline back tenderness.  Does have some mild right lower paralumbar spinal tenderness. symmetrical reflexes.  Skin: Normal color. No rash or diaphoresis.  Neurologic: Face symmetric, moves all extremities spontaneously.  Gait.  No weakness to lower extremities with strength testing.  Symmetric reflexes.  Speech clear. Normal gait. No weakness to the lower extremities.  Psychiatric: Oriented to person, place, and time. Affect appropriate.    LAB:  All pertinent labs reviewed and interpreted.  Results for orders placed or performed during the hospital encounter of 02/28/24   Chest XR,  PA & LAT    Impression    IMPRESSION: Negative chest.   UA with Microscopic reflex to Culture    Specimen: Urine, Midstream   Result Value Ref Range    Color Urine Light Yellow Colorless, Straw, Light Yellow, Yellow    Appearance Urine Clear Clear    Glucose Urine Negative Negative mg/dL    Bilirubin Urine Negative Negative    Ketones Urine 20 (A) Negative mg/dL    Specific Gravity Urine  1.010 1.001 - 1.030    Blood Urine Negative Negative    pH Urine 6.5 5.0 - 7.0    Protein Albumin Urine Negative Negative mg/dL    Urobilinogen Urine <2.0 <2.0 mg/dL    Nitrite Urine Negative Negative    Leukocyte Esterase Urine Negative Negative    RBC Urine 0 <=2 /HPF    WBC Urine 2 <=5 /HPF    Squamous Epithelials Urine 1 <=1 /HPF   Comprehensive metabolic panel   Result Value Ref Range    Sodium 139 135 - 145 mmol/L    Potassium 3.2 (L) 3.4 - 5.3 mmol/L    Carbon Dioxide (CO2) 26 22 - 29 mmol/L    Anion Gap 11 7 - 15 mmol/L    Urea Nitrogen 5.5 (L) 8.0 - 23.0 mg/dL    Creatinine 0.32 (L) 0.51 - 0.95 mg/dL    GFR Estimate >90 >60 mL/min/1.73m2    Calcium 9.2 8.8 - 10.2 mg/dL    Chloride 102 98 - 107 mmol/L    Glucose 157 (H) 70 - 99 mg/dL    Alkaline Phosphatase 76 40 - 150 U/L    AST 31 0 - 45 U/L    ALT 15 0 - 50 U/L    Protein Total 7.6 6.4 - 8.3 g/dL    Albumin 4.2 3.5 - 5.2 g/dL    Bilirubin Total 0.4 <=1.2 mg/dL   Symptomatic Influenza A/B, RSV, & SARS-CoV2 PCR (COVID-19) Nasopharyngeal    Specimen: Nasopharyngeal; Swab   Result Value Ref Range    Influenza A PCR Negative Negative    Influenza B PCR Negative Negative    RSV PCR Negative Negative    SARS CoV2 PCR Negative Negative   Lactic acid whole blood   Result Value Ref Range    Lactic Acid 0.9 0.7 - 2.0 mmol/L   CBC with platelets and differential   Result Value Ref Range    WBC Count 10.6 4.0 - 11.0 10e3/uL    RBC Count 4.53 3.80 - 5.20 10e6/uL    Hemoglobin 13.0 11.7 - 15.7 g/dL    Hematocrit 39.2 35.0 - 47.0 %    MCV 87 78 - 100 fL    MCH 28.7 26.5 - 33.0 pg    MCHC 33.2 31.5 - 36.5 g/dL    RDW 13.7 10.0 - 15.0 %    Platelet Count 156 150 - 450 10e3/uL    % Neutrophils 75 %    % Lymphocytes 17 %    % Monocytes 7 %    % Eosinophils 0 %    % Basophils 0 %    % Immature Granulocytes 0 %    NRBCs per 100 WBC 0 <1 /100    Absolute Neutrophils 7.9 1.6 - 8.3 10e3/uL    Absolute Lymphocytes 1.8 0.0 - 5.3 10e3/uL    Absolute Monocytes 0.7 0.0 - 1.3 10e3/uL     Absolute Eosinophils 0.0 0.0 - 0.7 10e3/uL    Absolute Basophils 0.0 0.0 - 0.2 10e3/uL    Absolute Immature Granulocytes 0.0 <=0.4 10e3/uL    Absolute NRBCs 0.0 10e3/uL   Result Value Ref Range    Magnesium 1.8 1.7 - 2.3 mg/dL   Troponin T, High Sensitivity (now)   Result Value Ref Range    Troponin T, High Sensitivity 7 <=14 ng/L   Result Value Ref Range    CRP Inflammation 59.60 (H) <5.00 mg/L   Erythrocyte sedimentation rate auto   Result Value Ref Range    Erythrocyte Sedimentation Rate 35 (H) 0 - 30 mm/hr   Result Value Ref Range    Troponin T, High Sensitivity 7 <=14 ng/L   ECG 12-LEAD WITH MUSE (LHE)   Result Value Ref Range    Systolic Blood Pressure 155 mmHg    Diastolic Blood Pressure 72 mmHg    Ventricular Rate 80 BPM    Atrial Rate 80 BPM    OH Interval 174 ms    QRS Duration 82 ms     ms    QTc 389 ms    P Axis 72 degrees    R AXIS 83 degrees    T Axis -32 degrees    Interpretation ECG       Sinus rhythm  ST & T wave abnormality, consider inferolateral ischemia  Abnormal ECG  When compared with ECG of 21-FEB-2012 01:02,  ST now depressed in Inferior leads  T wave inversion now evident in Inferior leads  Inverted T waves have replaced nonspecific T wave abnormality in Lateral leads  Confirmed by SEE ED PROVIDER NOTE FOR, ECG INTERPRETATION ([id number]),  ERICH BARNEY ([id number]) on 2/29/2024 12:44:06 AM     Group A Streptococcus PCR Throat Swab    Specimen: Throat; Swab   Result Value Ref Range    Group A strep by PCR Detected (A) Not Detected       RADIOLOGY:  Reviewed all pertinent imaging. Please see official radiology report.  Chest XR,  PA & LAT   Final Result   IMPRESSION: Negative chest.          EKG:    Performed at: 2/29/2024 00:37:32    Impression: Sinus rhythm. ST & T wave abnormality, consider inferolateral ischemia.    Rate: 80 BPM  Rhythm: Sinus rhythm  Axis: 83  OH Interval: 174 ms  QRS Interval: 82 ms  QTc Interval: 389 ms  ST Changes: ST & T wave abnormality.  Comparison: When  compared with ECG of 2/21/2012 01:02, ST now depressed in Inferior leads. T wave inversion now evident in Inferior leads. Inverted T waves have replaced nonspecific T wave abnormality in Lateral leads.    I have independently reviewed and interpreted the EKG(s) documented above.          I, Jennifer Young, am serving as a scribe to document services personally performed by Eric Dong MD based on my observation and the provider's statements to me. I, Eric Dong MD, attest that Jennifer Young is acting in a scribe capacity, has observed my performance of the services and has documented them in accordance with my direction.    Eric Dong MD  [facility] EMERGENCY ROOM  [address] [address] [phone]      Eric Dong MD  02/29/24 0242

## 2024-02-29 NOTE — DISCHARGE INSTRUCTIONS
You have strep throat.  This can cause sore throat and bodyaches and headache and fatigue.  Recommend penicillin twice a day for 10 days.  Your EKG is quite abnormal recommend follow-up with your primary care doctor for consideration of stress test or other cardiac evaluation.  return to the ER for worsening symptoms.  recommend use ibuprofen and/or Tylenol for fever and sore throat and aches

## 2024-02-29 NOTE — ED TRIAGE NOTES
Patient arrives to the ER with c/o fever and urinary incontinence. Patient states that she believes she has a UTI. Denies pain wit urination. Endorses R flank pain. Also endorses throat pain with swallowing. Last took Tylenol this AM.      Triage Assessment (Adult)       Row Name 02/28/24 1512          Triage Assessment    Airway WDL WDL        Respiratory WDL    Respiratory WDL WDL        Skin Circulation/Temperature WDL    Skin Circulation/Temperature WDL WDL        Cardiac WDL    Cardiac WDL WDL        Peripheral/Neurovascular WDL    Peripheral Neurovascular WDL WDL        Cognitive/Neuro/Behavioral WDL    Cognitive/Neuro/Behavioral WDL WDL

## 2024-04-02 NOTE — ED TRIAGE NOTES
ED Triage Notes by Cynthia Rodriguez, RN at 7/5/2021  7:59 PM     Author: Cynthia Rodriguez, RN Service: Emergency Medicine Author Type: Registered Nurse    Filed: 7/5/2021  8:02 PM Date of Service: 7/5/2021  7:59 PM Status: Signed    : Cynthia Rodriguez RN (Registered Nurse)       Son acting as :  States mom started complaining of pain to left wrist yesterday.  Denies injury Wrist swollen  Radial pulse noted. GCS 15        No PMH. BIBA from home. approx 2 hours ago, go R middle finger stuck in door. Finger nail cracked. Some bleeding noted around nail bed. Pt awake, alert, interacting appropriately. Pt coloring appropriate, brisk capillary refill noted, easy WOB noted.

## 2025-05-07 ENCOUNTER — APPOINTMENT (OUTPATIENT)
Dept: MRI IMAGING | Facility: CLINIC | Age: 70
End: 2025-05-07
Attending: EMERGENCY MEDICINE
Payer: MEDICARE

## 2025-05-07 ENCOUNTER — HOSPITAL ENCOUNTER (EMERGENCY)
Facility: CLINIC | Age: 70
Discharge: HOME | End: 2025-05-07
Attending: EMERGENCY MEDICINE | Admitting: EMERGENCY MEDICINE
Payer: COMMERCIAL

## 2025-05-07 VITALS
HEART RATE: 68 BPM | SYSTOLIC BLOOD PRESSURE: 159 MMHG | OXYGEN SATURATION: 98 % | RESPIRATION RATE: 16 BRPM | TEMPERATURE: 97.1 F | DIASTOLIC BLOOD PRESSURE: 77 MMHG

## 2025-05-07 DIAGNOSIS — H81.399 PERIPHERAL VERTIGO, UNSPECIFIED LATERALITY: ICD-10-CM

## 2025-05-07 PROBLEM — E04.2 MULTIPLE THYROID NODULES: Status: ACTIVE | Noted: 2023-09-24

## 2025-05-07 PROBLEM — M54.59 MECHANICAL LOW BACK PAIN: Status: ACTIVE | Noted: 2019-01-17

## 2025-05-07 PROBLEM — E03.8 SUBCLINICAL HYPOTHYROIDISM: Status: ACTIVE | Noted: 2023-12-17

## 2025-05-07 PROBLEM — M62.50 MUSCULAR WASTING AND DISUSE ATROPHY: Status: ACTIVE | Noted: 2021-09-09

## 2025-05-07 PROBLEM — C73 THYROID CANCER (H): Status: ACTIVE | Noted: 2024-10-09

## 2025-05-07 PROBLEM — I45.10 RBBB: Status: ACTIVE | Noted: 2022-09-07

## 2025-05-07 PROBLEM — E55.9 VITAMIN D DEFICIENCY: Status: ACTIVE | Noted: 2024-04-03

## 2025-05-07 LAB
ALBUMIN SERPL BCG-MCNC: 4.3 G/DL (ref 3.5–5.2)
ALP SERPL-CCNC: 59 U/L (ref 40–150)
ALT SERPL W P-5'-P-CCNC: 14 U/L (ref 0–50)
ANION GAP SERPL CALCULATED.3IONS-SCNC: 12 MMOL/L (ref 7–15)
AST SERPL W P-5'-P-CCNC: 25 U/L (ref 0–45)
ATRIAL RATE - MUSE: 66 BPM
BASOPHILS # BLD AUTO: 0 10E3/UL (ref 0–0.2)
BASOPHILS NFR BLD AUTO: 0 %
BILIRUB SERPL-MCNC: 0.2 MG/DL
BUN SERPL-MCNC: 9.2 MG/DL (ref 8–23)
CALCIUM SERPL-MCNC: 9.6 MG/DL (ref 8.8–10.4)
CHLORIDE SERPL-SCNC: 105 MMOL/L (ref 98–107)
CREAT SERPL-MCNC: 0.4 MG/DL (ref 0.51–0.95)
DIASTOLIC BLOOD PRESSURE - MUSE: NORMAL MMHG
EGFRCR SERPLBLD CKD-EPI 2021: >90 ML/MIN/1.73M2
EOSINOPHIL # BLD AUTO: 0 10E3/UL (ref 0–0.7)
EOSINOPHIL NFR BLD AUTO: 0 %
ERYTHROCYTE [DISTWIDTH] IN BLOOD BY AUTOMATED COUNT: 13.5 % (ref 10–15)
GLUCOSE SERPL-MCNC: 179 MG/DL (ref 70–99)
HCO3 SERPL-SCNC: 25 MMOL/L (ref 22–29)
HCT VFR BLD AUTO: 38.7 % (ref 35–47)
HGB BLD-MCNC: 12.8 G/DL (ref 11.7–15.7)
IMM GRANULOCYTES # BLD: 0 10E3/UL
IMM GRANULOCYTES NFR BLD: 0 %
INTERPRETATION ECG - MUSE: NORMAL
LIPASE SERPL-CCNC: 19 U/L (ref 13–60)
LYMPHOCYTES # BLD AUTO: 1 10E3/UL (ref 0.8–5.3)
LYMPHOCYTES NFR BLD AUTO: 19 %
MCH RBC QN AUTO: 29 PG (ref 26.5–33)
MCHC RBC AUTO-ENTMCNC: 33.1 G/DL (ref 31.5–36.5)
MCV RBC AUTO: 88 FL (ref 78–100)
MONOCYTES # BLD AUTO: 0.3 10E3/UL (ref 0–1.3)
MONOCYTES NFR BLD AUTO: 5 %
NEUTROPHILS # BLD AUTO: 3.8 10E3/UL (ref 1.6–8.3)
NEUTROPHILS NFR BLD AUTO: 75 %
NRBC # BLD AUTO: 0 10E3/UL
NRBC BLD AUTO-RTO: 0 /100
P AXIS - MUSE: 48 DEGREES
PLATELET # BLD AUTO: 194 10E3/UL (ref 150–450)
POTASSIUM SERPL-SCNC: 3.9 MMOL/L (ref 3.4–5.3)
PR INTERVAL - MUSE: 180 MS
PROT SERPL-MCNC: 7.4 G/DL (ref 6.4–8.3)
QRS DURATION - MUSE: 76 MS
QT - MUSE: 414 MS
QTC - MUSE: 434 MS
R AXIS - MUSE: 73 DEGREES
RBC # BLD AUTO: 4.41 10E6/UL (ref 3.8–5.2)
SODIUM SERPL-SCNC: 142 MMOL/L (ref 135–145)
SYSTOLIC BLOOD PRESSURE - MUSE: NORMAL MMHG
T AXIS - MUSE: 74 DEGREES
TROPONIN T SERPL HS-MCNC: 13 NG/L
TROPONIN T SERPL HS-MCNC: 18 NG/L
VENTRICULAR RATE- MUSE: 66 BPM
WBC # BLD AUTO: 5.1 10E3/UL (ref 4–11)

## 2025-05-07 PROCEDURE — A9585 GADOBUTROL INJECTION: HCPCS | Performed by: EMERGENCY MEDICINE

## 2025-05-07 PROCEDURE — 255N000002 HC RX 255 OP 636: Performed by: EMERGENCY MEDICINE

## 2025-05-07 PROCEDURE — 84155 ASSAY OF PROTEIN SERUM: CPT | Performed by: EMERGENCY MEDICINE

## 2025-05-07 PROCEDURE — 85025 COMPLETE CBC W/AUTO DIFF WBC: CPT | Performed by: EMERGENCY MEDICINE

## 2025-05-07 PROCEDURE — 99285 EMERGENCY DEPT VISIT HI MDM: CPT | Mod: 25

## 2025-05-07 PROCEDURE — 36415 COLL VENOUS BLD VENIPUNCTURE: CPT | Performed by: EMERGENCY MEDICINE

## 2025-05-07 PROCEDURE — 70549 MR ANGIOGRAPH NECK W/O&W/DYE: CPT

## 2025-05-07 PROCEDURE — 70544 MR ANGIOGRAPHY HEAD W/O DYE: CPT

## 2025-05-07 PROCEDURE — 83690 ASSAY OF LIPASE: CPT | Performed by: EMERGENCY MEDICINE

## 2025-05-07 PROCEDURE — 250N000011 HC RX IP 250 OP 636: Performed by: EMERGENCY MEDICINE

## 2025-05-07 PROCEDURE — 96374 THER/PROPH/DIAG INJ IV PUSH: CPT | Mod: 59

## 2025-05-07 PROCEDURE — 258N000003 HC RX IP 258 OP 636: Performed by: EMERGENCY MEDICINE

## 2025-05-07 PROCEDURE — 93005 ELECTROCARDIOGRAM TRACING: CPT | Performed by: EMERGENCY MEDICINE

## 2025-05-07 PROCEDURE — 70553 MRI BRAIN STEM W/O & W/DYE: CPT

## 2025-05-07 PROCEDURE — 96361 HYDRATE IV INFUSION ADD-ON: CPT

## 2025-05-07 PROCEDURE — 250N000013 HC RX MED GY IP 250 OP 250 PS 637: Performed by: EMERGENCY MEDICINE

## 2025-05-07 PROCEDURE — 84484 ASSAY OF TROPONIN QUANT: CPT | Performed by: EMERGENCY MEDICINE

## 2025-05-07 RX ORDER — GADOBUTROL 604.72 MG/ML
10 INJECTION INTRAVENOUS ONCE
Status: COMPLETED | OUTPATIENT
Start: 2025-05-07 | End: 2025-05-07

## 2025-05-07 RX ORDER — MECLIZINE HCL 12.5 MG 12.5 MG/1
12.5 TABLET ORAL 4 TIMES DAILY PRN
Qty: 30 TABLET | Refills: 0 | Status: SHIPPED | OUTPATIENT
Start: 2025-05-07

## 2025-05-07 RX ORDER — ONDANSETRON 2 MG/ML
8 INJECTION INTRAMUSCULAR; INTRAVENOUS ONCE
Status: COMPLETED | OUTPATIENT
Start: 2025-05-07 | End: 2025-05-07

## 2025-05-07 RX ORDER — ONDANSETRON 4 MG/1
4-8 TABLET, ORALLY DISINTEGRATING ORAL EVERY 8 HOURS PRN
Qty: 20 TABLET | Refills: 0 | Status: SHIPPED | OUTPATIENT
Start: 2025-05-07 | End: 2025-05-10

## 2025-05-07 RX ORDER — MECLIZINE HYDROCHLORIDE 25 MG/1
25 TABLET ORAL ONCE
Status: COMPLETED | OUTPATIENT
Start: 2025-05-07 | End: 2025-05-07

## 2025-05-07 RX ADMIN — ONDANSETRON 8 MG: 2 INJECTION, SOLUTION INTRAMUSCULAR; INTRAVENOUS at 10:26

## 2025-05-07 RX ADMIN — GADOBUTROL 10 ML: 604.72 INJECTION INTRAVENOUS at 13:14

## 2025-05-07 RX ADMIN — SODIUM CHLORIDE 1000 ML: 0.9 INJECTION, SOLUTION INTRAVENOUS at 10:29

## 2025-05-07 RX ADMIN — MECLIZINE 25 MG: 25 TABLET ORAL at 10:55

## 2025-05-07 ASSESSMENT — ACTIVITIES OF DAILY LIVING (ADL)
ADLS_ACUITY_SCORE: 41

## 2025-05-07 NOTE — Clinical Note
Micki accompanied Judith Cervantes to the emergency department on 5/7/2025. They may return to work on 05/08/2025.      If you have any questions or concerns, please don't hesitate to call.      Viki Lomax, RN

## 2025-05-07 NOTE — ED TRIAGE NOTES
Pt arrives with her son and IPAD  used for Kiswahili language.  Pt reports she woke up at 0300 with vomiting and dizziness.  Pt reports dizziness is worse with movement and intermittent.  Pt reports epigastric pain and denies diarrhea.  Pt denies any falls or injuries.

## 2025-05-07 NOTE — Clinical Note
Ms. Cervantes's Caregiver accompanied Judith Cervantes to the emergency department on 5/7/2025. They may return to work on 05/08/2025.      If you have any questions or concerns, please don't hesitate to call.      Viki Lomax, RN

## 2025-05-07 NOTE — DISCHARGE INSTRUCTIONS
Fortunately, the MRI imaging shows that this is not coming from the brain and it is not something like a stroke.  Use the medications provided to help and do the exercises provided with this paperwork.  This should improve your symptoms and if still not getting better see your doctor for referral to specialists.

## 2025-05-07 NOTE — ED PROVIDER NOTES
EMERGENCY DEPARTMENT ENCOUNTER     NAME: Judith Cervantes   AGE: 69 year old female   YOB: 1955   MRN: [id number]   EVALUATION DATE & TIME: 5/7/2025  9:45 AM   PCP: Wilber Otoole     Chief Complaint   Patient presents with    Dizziness    Vomiting   :    FINAL IMPRESSION       1. Peripheral vertigo, unspecified laterality           ED COURSE & MEDICAL DECISION MAKING      Pertinent Labs & Imaging studies reviewed. (See chart for details)   69 year old female  presents to the Emergency Department for evaluation of new onset vertigo with nausea and vomiting. Initial Vitals Reviewed. Initial exam notable for patient was initially sitting in a wheelchair with obvious vertigo, at times reaching out to grab onto things and gagging into a plastic bag but without any actual vomiting.  There is no focal neurologic deficit.  Last known well was at the time she went to bed last night, so it would not fit with a tier 1 stroke code and her NIH stroke scale would not be greater than 5 that would require a tier 2 stroke code so I did not call a code on this patient.  I did consider posterior CVA, other posterior cerebral pathology and ordered MRI/MRA of the brain and neck.  Fortunately these are unremarkable.  I did also consider triggers like anemia, ACS, electrolyte deficiencies and labs are generally reassuring.  First troponin is 13 and unfortunately the redraw hemolyzed, so at time of discharge the delta troponin is still pending.  I have reevaluated the patient and discussed the results with patient and family, and she is actually feeling significantly improved, tolerating oral intake and was even asking for a meal.  I think that as long as delta troponin is negative she will be able to discharge with prescriptions for meclizine and Zofran as well as outpatient follow-up.           At the conclusion of the encounter I discussed the results of all of the tests and the disposition. The questions were answered.  "The patient or family acknowledged understanding and was agreeable with the care plan.     0 minutes critical care time, see procedure note below for details if relevant    Medical Decision Making  I obtained history from Family Member/Significant Other  I reviewed the EMR: Outpatient Record: Last office visit 4/17/2025  Care impacted by Diabetes  Admission considered. Patient was signed out to the oncoming physician, disposition pending.    MIPS (CTPE, Dental pain, Benson, Sinusitis, Asthma/COPD, Head Trauma): Not Applicable    SEPSIS: None                      MEDICATIONS GIVEN IN THE EMERGENCY:   Medications - No data to display   NEW PRESCRIPTIONS STARTED AT TODAY'S ER VISIT   New Prescriptions    No medications on file     ================================================================   HISTORY OF PRESENT ILLNESS       Patient information was obtained from: patient and son   Use of Intrepreter: Yes (MARTII) - Chineseadarsh Cervantes is a 69 year old female with history of DMII, thyroid cancer, RBBB, and hypertension who presents for evaluation of dizziness and vomiting.    Patient reports that she woke up around 3:00 AM this morning feeling dizzy and nauseous. She has since been gagging and vomiting, and has intermittent \"room-spinning\" sensation. Currently rates her pain a 10/10 and wants to lie down. She denies falling out of bed, but was saying \"I felt like the bed was going to flip\". Denies any recent falls. She is a diabetic, and son reports blood glucose of 130 this morning when he checked it.    ================================================================        PAST HISTORY     PAST MEDICAL HISTORY:   No past medical history on file.   PAST SURGICAL HISTORY:   No past surgical history on file.   CURRENT MEDICATIONS:   famotidine (PEPCID) 20 MG tablet  ondansetron (ZOFRAN) 4 MG tablet  sucralfate (CARAFATE) 100 mg/mL suspension      ALLERGIES:   No Known Allergies   FAMILY HISTORY:   No family " history on file.   SOCIAL HISTORY:   Social History     Socioeconomic History    Marital status: Single        VITALS  Patient Vitals for the past 24 hrs:   BP Temp Temp src Pulse Resp SpO2   05/07/25 1445 -- -- -- 71 -- 98 %   05/07/25 1400 (!) 166/79 -- -- 66 -- 98 %   05/07/25 1130 (!) 146/70 -- -- 65 -- 95 %   05/07/25 1112 (!) 144/74 -- -- 71 -- 95 %   05/07/25 1000 (!) 192/93 97.1  F (36.2  C) Temporal 72 18 97 %        ================================================================    PHYSICAL EXAM     VITAL SIGNS: BP (!) 166/79   Pulse 71   Temp 97.1  F (36.2  C) (Temporal)   Resp 18   SpO2 98%    Constitutional:  Awake, sitting in a wheelchair, actively retching into a plastic bag  HENT:  Atraumatic, oropharynx without exudate or erythema, membranes moist  Lymph:  No adenopathy  Eyes: EOM intact, PERRL, no injection, no nystagmus  Neck: Supple  Respiratory:  Clear to auscultation bilaterally, no wheezes or crackles   Cardiovascular:  Regular rate and rhythm, single S1 and S2   GI:  Soft, nontender, nondistended, no rebound or guarding   Musculoskeletal:  Moves all extremities, no lower extremity edema, no deformities    Skin:  Warm, dry  Neurologic:  Alert and oriented x3, no focal deficits noted       ================================================================  LAB       All pertinent labs reviewed and interpreted.   Labs Ordered and Resulted from Time of ED Arrival to Time of ED Departure   COMPREHENSIVE METABOLIC PANEL - Abnormal       Result Value    Sodium 142      Potassium 3.9      Carbon Dioxide (CO2) 25      Anion Gap 12      Urea Nitrogen 9.2      Creatinine 0.40 (*)     GFR Estimate >90      Calcium 9.6      Chloride 105      Glucose 179 (*)     Alkaline Phosphatase 59      AST 25      ALT 14      Protein Total 7.4      Albumin 4.3      Bilirubin Total 0.2     TROPONIN T, HIGH SENSITIVITY - Normal    Troponin T, High Sensitivity 13     LIPASE - Normal    Lipase 19     CBC WITH PLATELETS  AND DIFFERENTIAL    WBC Count 5.1      RBC Count 4.41      Hemoglobin 12.8      Hematocrit 38.7      MCV 88      MCH 29.0      MCHC 33.1      RDW 13.5      Platelet Count 194      % Neutrophils 75      % Lymphocytes 19      % Monocytes 5      % Eosinophils 0      % Basophils 0      % Immature Granulocytes 0      NRBCs per 100 WBC 0      Absolute Neutrophils 3.8      Absolute Lymphocytes 1.0      Absolute Monocytes 0.3      Absolute Eosinophils 0.0      Absolute Basophils 0.0      Absolute Immature Granulocytes 0.0      Absolute NRBCs 0.0     TROPONIN T, HIGH SENSITIVITY        ===============================================================  RADIOLOGY       Reviewed all pertinent imaging. Please see official radiology report.   MRA Neck (Carotids) wo & w Contrast   Final Result   IMPRESSION:   HEAD MRI:    1.  No acute intracranial process.   2.  Generalized brain atrophy and presumed microvascular ischemic changes as detailed above.      HEAD MRA:    1.  No large vessel occlusion or hemodynamically significant stenosis throughout the [place] arteries.      NECK MRA:   1.  No hemodynamically significant stenosis or dissection throughout the major neck arteries.      MRA Brain ([place]) wo Contrast   Final Result   IMPRESSION:   HEAD MRI:    1.  No acute intracranial process.   2.  Generalized brain atrophy and presumed microvascular ischemic changes as detailed above.      HEAD MRA:    1.  No large vessel occlusion or hemodynamically significant stenosis throughout the [place] arteries.      NECK MRA:   1.  No hemodynamically significant stenosis or dissection throughout the major neck arteries.      MR Brain w/o & w Contrast   Final Result   IMPRESSION:   HEAD MRI:    1.  No acute intracranial process.   2.  Generalized brain atrophy and presumed microvascular ischemic changes as detailed above.      HEAD MRA:    1.  No large vessel occlusion or hemodynamically significant stenosis  throughout the [place] arteries.      NECK MRA:   1.  No hemodynamically significant stenosis or dissection throughout the major neck arteries.            ================================================================  EKG     EKG reviewed interpreted by me shows sinus rhythm with rate of 66, normal axis, QTc 434 with no acute ST or T wave changes since 29 February 2024    I have independently reviewed and interpreted the EKG(s) documented above.     ================================================================  PROCEDURES         I, Kamila Abreu, am serving as a scribe to document services personally performed by Dr. Hatch based on my observation and the provider's statements to me. I, Capri Hatch MD attest that Kamila Abreu is acting in a scribe capacity, has observed my performance of the services and has documented them in accordance with my direction.   Capri Hatch M.D.   Emergency Medicine   [facility] EMERGENCY ROOM  [address] [facility] [facility] [address] [address]  [phone]  Dept: [phone]      Capri Hatch MD  05/07/25 9662